# Patient Record
Sex: FEMALE | Race: WHITE
[De-identification: names, ages, dates, MRNs, and addresses within clinical notes are randomized per-mention and may not be internally consistent; named-entity substitution may affect disease eponyms.]

---

## 2020-11-07 ENCOUNTER — HOSPITAL ENCOUNTER (EMERGENCY)
Dept: HOSPITAL 41 - JD.ED | Age: 30
Discharge: HOME | End: 2020-11-07
Payer: COMMERCIAL

## 2020-11-07 DIAGNOSIS — Z88.0: ICD-10-CM

## 2020-11-07 DIAGNOSIS — J10.1: Primary | ICD-10-CM

## 2020-11-07 NOTE — EDM.PDOC
ED HPI GENERAL MEDICAL PROBLEM





- General


Chief Complaint: Respiratory Problem


Stated Complaint: COUGH/SOB


Time Seen by Provider: 20 17:13


Source of Information: Reports: Patient, RN Notes Reviewed


History Limitations: Reports: No Limitations





- History of Present Illness


INITIAL COMMENTS - FREE TEXT/NARRATIVE: 





Patient is a 30-year-old female who presents to the ED for evaluation of her 

cough and shortness of breath.  The patient has recently traveled here from 

[place] as she has moved to [place].  She notes that she has had Covid-like 

symptoms for the past week and a half.  She has been tested for COVID-19 twice 

during that time and both times it was negative.  Patient notes she is 

continuing to have shortness of breath, and she is taking more deep/sigh breaths

more often.  She notes that she has a pulse oximeter at home, and she states 

that her levels would drop to roughly 94% while she is talking.  O2 sats at time

of triage are 98% on room air.  Patient also notes that she gets a pretty bad 

bout of bronchitis in the wintertime, and is questioning if it was not that as 

well.  She has a very harsh nonproductive dry cough.  She notes within this time

she is only had a low-grade fever of 100.1 F a few days back.  She does have a 

headache from time to time when she has coughing fits but takes some ibuprofen 

and that seems to help.  She did get her influenza vaccine at work in [place] 

before traveling here.  She has been using generic DayQuil for symptomatic 

management but states this is not helping much.  She denies any chance of 

pregnancy as she states her last menses was at the mid to late end of October.  

She is not complaining of any nausea or vomiting.


  ** Headache


Pain Score (Numeric/FACES): 5





- Related Data


                                    Allergies











Allergy/AdvReac Type Severity Reaction Status Date / Time


 


Penicillins Allergy Severe Rash Verified 20 17:16











Home Meds: 


                                    Home Meds





Codeine/Promethazine [Phenergan with Codeine] 5 ml PO Q6HR #120 ml 20 [Rx]











Past Medical History


Respiratory History: Reports: Other (See Below) (gets bronchitis pretty much 

yearly in the winter)





- Past Surgical History


Female  Surgical History: Reports:  Section





Social & Family History





- Tobacco Use


Tobacco Use Status *Q: Never Tobacco User


Second Hand Smoke Exposure: No





- Caffeine Use


Caffeine Use: Reports: Coffee, Soda, Tea





- Recreational Drug Use


Recreational Drug Use: No





ED ROS GENERAL





- Review of Systems


Review Of Systems: Comprehensive ROS is negative, except as noted in HPI.





ED EXAM, GENERAL





- Physical Exam


Exam: See Below


Exam Limited By: No Limitations


General Appearance: Alert, WD/WN, No Apparent Distress


Throat/Mouth: Normal Inspection


Respiratory/Chest: No Respiratory Distress, Lungs Clear, Normal Breath Sounds, 

No Accessory Muscle Use, Chest Non-Tender, Other (pt has dry, harsh, 

intermittent cough throughout exam)


Cardiovascular: Normal Peripheral Pulses, Regular Rate, Rhythm, No Murmur


Peripheral Pulses: 2+: Radial (L), Radial (R)


Extremities: Normal Inspection, Normal Capillary Refill


Neurological: Alert, Oriented, Normal Cognition, No Motor/Sensory Deficits


Psychiatric: Normal Affect, Normal Mood


Skin Exam: Warm, Dry, Intact, Normal Color, No Rash





Course





- Vital Signs


Last Recorded V/S: 


                                Last Vital Signs











Temp  98.1 F   20 17:15


 


Pulse  103 H  20 17:15


 


Resp  20   20 17:15


 


BP  150/104 H  20 17:15


 


Pulse Ox  98   20 17:15














- Orders/Labs/Meds


Orders: 


                               Active Orders 24 hr











 Category Date Time Status


 


 Chest 1V Frontal [CR] Stat Exams  20 17:34 Ordered


 


 Isolation [COMM] Routine Oth  20 17:35 Ordered














- Re-Assessments/Exams


Free Text/Narrative Re-Assessment/Exam: 





20 17:38


Patient presents to the ED for evaluation of her ongoing respiratory symptoms.  

I do highly suspect that she may have a bronchitis in nature, she will be tested

 for influenza as well, I do less likely suspect COVID-19 as the patient has 

been symptomatic for a week and a half, and she has not tested positive within 

that time.  She is not delineated worsening of symptoms during that time to 

suggest a possible COVID-19 infection.





20 18:31


This x-ray shows no overt signs of pneumonia.  The patient's influenza swab was 

positive for influenza B.  She will be sent home with some cough medicine, and 

some general recommendations as this has been going on for about a week.





Departure





- Departure


Time of Disposition: 18:32


Disposition: Home, Self-Care 01


Condition: Good


Clinical Impression: 


 Influenza B








- Discharge Information


*PRESCRIPTION DRUG MONITORING PROGRAM REVIEWED*: Yes


*COPY OF PRESCRIPTION DRUG MONITORING REPORT IN PATIENT [facility]O: No


Prescriptions: 


Codeine/Promethazine [Phenergan with Codeine] 5 ml PO Q6HR #120 ml


Instructions:  Influenza, Adult, Easy-to-Read


Referrals: 


PCP,None [Primary Care Provider] - 


Forms:  ED Department Discharge


Additional Instructions: 


You were evaluated in the ER today for ongoing cough and shortness of breath.





Your chest x-ray demonstrated no obvious signs of pneumonia, your influenza swab

was positive for influenza B at today's visit.





You have been given a prescription for cough medication, please take as 

directed, every 6 hours as needed for further cough.





I would recommend that you try to limit your exposure to others, until you are 

symptom-free for about 24 hours.





Please return to the ER at any time if symptoms change or worsen.





Sepsis Event Note (ED)





- Evaluation


Sepsis Screening Result: No Definite Risk





- Focused Exam


Vital Signs: 


                                   Vital Signs











  Temp Pulse Resp BP Pulse Ox


 


 20 17:15  98.1 F  103 H  20  150/104 H  98














- My Orders


Last 24 Hours: 


My Active Orders





20 17:34


Chest 1V Frontal [CR] Stat 





20 17:35


Isolation [COMM] Routine 














- Assessment/Plan


Last 24 Hours: 


My Active Orders





20 17:34


Chest 1V Frontal [CR] Stat 





20 17:35


Isolation [COMM] Routine